# Patient Record
Sex: FEMALE | Race: WHITE | NOT HISPANIC OR LATINO | Employment: STUDENT | URBAN - METROPOLITAN AREA
[De-identification: names, ages, dates, MRNs, and addresses within clinical notes are randomized per-mention and may not be internally consistent; named-entity substitution may affect disease eponyms.]

---

## 2023-05-30 DIAGNOSIS — E65 ABDOMINAL OBESITY: Primary | ICD-10-CM

## 2023-05-30 NOTE — PROGRESS NOTES
Subjective   Reason for Visit: Ryleigh R Luke is an 22 y.o. female here for a Medicare Wellness visit.               HPI    Patient Care Team:  Des Singh DO as PCP - General     Review of Systems    Objective   Vitals:  There were no vitals taken for this visit.      Physical Exam    Assessment/Plan   Problem List Items Addressed This Visit    None

## 2023-06-01 LAB — CORTISOL (UG/DL) IN SERUM - AM: 9.6 UG/DL (ref 5–20)

## 2023-06-05 ENCOUNTER — TELEPHONE (OUTPATIENT)
Dept: PRIMARY CARE | Facility: CLINIC | Age: 23
End: 2023-06-05
Payer: COMMERCIAL

## 2023-06-05 LAB — ADRENOCORTICOTROPIC HORMONE: 17.4 PG/ML (ref 7.2–63.3)

## 2023-08-07 ENCOUNTER — OFFICE VISIT (OUTPATIENT)
Dept: PRIMARY CARE | Facility: CLINIC | Age: 23
End: 2023-08-07
Payer: COMMERCIAL

## 2023-08-07 VITALS
WEIGHT: 256.9 LBS | TEMPERATURE: 96.4 F | BODY MASS INDEX: 42.8 KG/M2 | SYSTOLIC BLOOD PRESSURE: 124 MMHG | OXYGEN SATURATION: 98 % | HEART RATE: 85 BPM | DIASTOLIC BLOOD PRESSURE: 76 MMHG | HEIGHT: 65 IN

## 2023-08-07 DIAGNOSIS — E28.2 PCOS (POLYCYSTIC OVARIAN SYNDROME): ICD-10-CM

## 2023-08-07 DIAGNOSIS — E66.01 CLASS 3 SEVERE OBESITY DUE TO EXCESS CALORIES WITHOUT SERIOUS COMORBIDITY WITH BODY MASS INDEX (BMI) OF 40.0 TO 44.9 IN ADULT (MULTI): ICD-10-CM

## 2023-08-07 DIAGNOSIS — Z00.00 ROUTINE GENERAL MEDICAL EXAMINATION AT A HEALTH CARE FACILITY: ICD-10-CM

## 2023-08-07 DIAGNOSIS — F41.9 ANXIETY: Primary | ICD-10-CM

## 2023-08-07 PROBLEM — E66.813 CLASS 3 SEVERE OBESITY DUE TO EXCESS CALORIES WITHOUT SERIOUS COMORBIDITY WITH BODY MASS INDEX (BMI) OF 40.0 TO 44.9 IN ADULT: Status: ACTIVE | Noted: 2023-08-07

## 2023-08-07 PROCEDURE — 99395 PREV VISIT EST AGE 18-39: CPT | Performed by: STUDENT IN AN ORGANIZED HEALTH CARE EDUCATION/TRAINING PROGRAM

## 2023-08-07 PROCEDURE — 3008F BODY MASS INDEX DOCD: CPT | Performed by: STUDENT IN AN ORGANIZED HEALTH CARE EDUCATION/TRAINING PROGRAM

## 2023-08-07 PROCEDURE — 1036F TOBACCO NON-USER: CPT | Performed by: STUDENT IN AN ORGANIZED HEALTH CARE EDUCATION/TRAINING PROGRAM

## 2023-08-07 RX ORDER — ARIPIPRAZOLE 5 MG/1
5 TABLET ORAL DAILY
COMMUNITY
End: 2023-08-07 | Stop reason: SDUPTHER

## 2023-08-07 RX ORDER — PHENTERMINE HYDROCHLORIDE 15 MG/1
15 CAPSULE ORAL
Qty: 30 CAPSULE | Refills: 0 | Status: SHIPPED | OUTPATIENT
Start: 2023-08-07 | End: 2023-09-21

## 2023-08-07 RX ORDER — MULTIVITAMIN
1 TABLET ORAL DAILY
COMMUNITY
Start: 2022-08-03

## 2023-08-07 RX ORDER — ARIPIPRAZOLE 5 MG/1
5 TABLET ORAL DAILY
Qty: 90 TABLET | Refills: 3 | Status: SHIPPED | OUTPATIENT
Start: 2023-08-07

## 2023-08-07 RX ORDER — ESCITALOPRAM OXALATE 10 MG/1
10 TABLET ORAL DAILY
Qty: 90 TABLET | Refills: 3 | Status: SHIPPED | OUTPATIENT
Start: 2023-08-07

## 2023-08-07 RX ORDER — ESCITALOPRAM OXALATE 10 MG/1
10 TABLET ORAL DAILY
COMMUNITY
End: 2023-08-07 | Stop reason: SDUPTHER

## 2023-08-07 RX ORDER — NORETHINDRONE ACETATE AND ETHINYL ESTRADIOL, ETHINYL ESTRADIOL AND FERROUS FUMARATE 1MG-10(24)
1 KIT ORAL DAILY
COMMUNITY
Start: 2023-07-31

## 2023-08-07 NOTE — PROGRESS NOTES
" Subjective   Patient ID: Ryleigh R Luke is a 23 y.o. female who presents for Annual Exam (Is fasting) and Weight Problem.    HPI     Autism: on lexapro and abilify which helps. Doing well. However she eats a lot of food despite being healthy food, had long discussion about meds, diet and exercise but is hard with her degree of autism. Likely has some PCOS as her menstrual cycle is irratic and has facial hair.     Review of Systems   All other systems reviewed and are negative.      Objective   /76 (BP Location: Right arm, Patient Position: Sitting)   Pulse 85   Temp 35.8 °C (96.4 °F) (Temporal)   Ht 1.662 m (5' 5.43\")   Wt 117 kg (256 lb 14.4 oz)   LMP 03/13/2023   SpO2 98%   BMI 42.19 kg/m²     Physical Exam  Constitutional:       Appearance: Normal appearance.   HENT:      Head: Normocephalic and atraumatic.      Right Ear: Tympanic membrane and ear canal normal.      Left Ear: Tympanic membrane and ear canal normal.      Mouth/Throat:      Mouth: Mucous membranes are moist.      Pharynx: Oropharynx is clear.   Eyes:      Extraocular Movements: Extraocular movements intact.      Conjunctiva/sclera: Conjunctivae normal.      Pupils: Pupils are equal, round, and reactive to light.   Cardiovascular:      Rate and Rhythm: Normal rate and regular rhythm.      Pulses: Normal pulses.      Heart sounds: Normal heart sounds.   Pulmonary:      Effort: Pulmonary effort is normal.      Breath sounds: Normal breath sounds.   Abdominal:      General: Abdomen is flat. Bowel sounds are normal.      Palpations: Abdomen is soft.   Musculoskeletal:         General: Normal range of motion.      Cervical back: Normal range of motion and neck supple.   Skin:     General: Skin is warm and dry.      Capillary Refill: Capillary refill takes 2 to 3 seconds.   Neurological:      General: No focal deficit present.      Mental Status: She is alert and oriented to person, place, and time. Mental status is at baseline. "   Psychiatric:         Mood and Affect: Mood normal.         Behavior: Behavior normal.         Thought Content: Thought content normal.         Judgment: Judgment normal.         Assessment/Plan    1. Anxiety    - ARIPiprazole (Abilify) 5 mg tablet; Take 1 tablet (5 mg) by mouth once daily.  Dispense: 90 tablet; Refill: 3  - escitalopram (Lexapro) 10 mg tablet; Take 1 tablet (10 mg) by mouth once daily.  Dispense: 90 tablet; Refill: 3    2. Class 3 severe obesity due to excess calories without serious comorbidity with body mass index (BMI) of 40.0 to 44.9 in adult (CMS/HCC)    - phentermine 15 mg capsule; Take 1 capsule (15 mg) by mouth once daily in the morning. Take before meals.  Dispense: 30 capsule; Refill: 0    3. PCOS (polycystic ovarian syndrome)    - US pelvis; Future    4. Prevention visit  - no labs, reveiwed recent  - weight loss  Follow up in 1 month

## 2023-09-18 ENCOUNTER — APPOINTMENT (OUTPATIENT)
Dept: PRIMARY CARE | Facility: CLINIC | Age: 23
End: 2023-09-18
Payer: COMMERCIAL

## 2023-09-21 ENCOUNTER — OFFICE VISIT (OUTPATIENT)
Dept: PRIMARY CARE | Facility: CLINIC | Age: 23
End: 2023-09-21
Payer: COMMERCIAL

## 2023-09-21 VITALS
BODY MASS INDEX: 42.22 KG/M2 | WEIGHT: 253.4 LBS | OXYGEN SATURATION: 96 % | SYSTOLIC BLOOD PRESSURE: 126 MMHG | DIASTOLIC BLOOD PRESSURE: 86 MMHG | HEART RATE: 110 BPM | HEIGHT: 65 IN

## 2023-09-21 DIAGNOSIS — E66.01 CLASS 3 SEVERE OBESITY DUE TO EXCESS CALORIES WITHOUT SERIOUS COMORBIDITY WITH BODY MASS INDEX (BMI) OF 40.0 TO 44.9 IN ADULT (MULTI): Primary | ICD-10-CM

## 2023-09-21 PROCEDURE — 3008F BODY MASS INDEX DOCD: CPT | Performed by: STUDENT IN AN ORGANIZED HEALTH CARE EDUCATION/TRAINING PROGRAM

## 2023-09-21 PROCEDURE — 1036F TOBACCO NON-USER: CPT | Performed by: STUDENT IN AN ORGANIZED HEALTH CARE EDUCATION/TRAINING PROGRAM

## 2023-09-21 PROCEDURE — 99213 OFFICE O/P EST LOW 20 MIN: CPT | Performed by: STUDENT IN AN ORGANIZED HEALTH CARE EDUCATION/TRAINING PROGRAM

## 2023-09-21 RX ORDER — PHENTERMINE HYDROCHLORIDE 30 MG/1
30 CAPSULE ORAL
Qty: 30 CAPSULE | Refills: 0 | Status: SHIPPED | OUTPATIENT
Start: 2023-09-21 | End: 2023-10-21

## 2023-09-21 RX ORDER — CEPHALEXIN 500 MG/1
500 CAPSULE ORAL 2 TIMES DAILY
COMMUNITY

## 2023-09-21 NOTE — PROGRESS NOTES
"Subjective   Patient ID: Ryleigh R Luke is a 23 y.o. female who presents for Weight Check.    HPI     Autism: on lexapro and abilify which helps. Doing well. However she eats a lot of food despite being healthy food, had long discussion about meds, diet and exercise but is hard with her degree of autism. Likely has some PCOS as her menstrual cycle is irratic and has facial hair.  PCOs negative, hormones negative, on phentermine and doing well lost a few lbs, eating less will increase dose    Review of Systems   All other systems reviewed and are negative.      Objective   /86 (BP Location: Right arm, Patient Position: Sitting)   Pulse 110   Ht 1.662 m (5' 5.43\")   Wt 115 kg (253 lb 6.4 oz)   SpO2 96%   BMI 41.62 kg/m²     Physical Exam  Constitutional:       Appearance: Normal appearance.   HENT:      Head: Normocephalic and atraumatic.      Right Ear: Tympanic membrane and ear canal normal.      Left Ear: Tympanic membrane and ear canal normal.      Mouth/Throat:      Mouth: Mucous membranes are moist.      Pharynx: Oropharynx is clear.   Eyes:      Extraocular Movements: Extraocular movements intact.      Conjunctiva/sclera: Conjunctivae normal.      Pupils: Pupils are equal, round, and reactive to light.   Cardiovascular:      Rate and Rhythm: Normal rate and regular rhythm.      Pulses: Normal pulses.      Heart sounds: Normal heart sounds.   Pulmonary:      Effort: Pulmonary effort is normal.      Breath sounds: Normal breath sounds.   Abdominal:      General: Abdomen is flat. Bowel sounds are normal.      Palpations: Abdomen is soft.   Musculoskeletal:         General: Normal range of motion.      Cervical back: Normal range of motion and neck supple.   Skin:     General: Skin is warm and dry.      Capillary Refill: Capillary refill takes 2 to 3 seconds.   Neurological:      General: No focal deficit present.      Mental Status: She is alert and oriented to person, place, and time. Mental status is " at baseline.   Psychiatric:         Mood and Affect: Mood normal.         Behavior: Behavior normal.         Thought Content: Thought content normal.         Judgment: Judgment normal.         Assessment/Plan   1. Class 3 severe obesity due to excess calories without serious comorbidity with body mass index (BMI) of 40.0 to 44.9 in adult (CMS/[facility])  - phentermine 30 mg capsule; Take 1 capsule (30 mg) by mouth once daily in the morning. Take before meals.  Dispense: 30 capsule; Refill: 0  - will up the dose  - follow up 1 month

## 2023-10-02 ENCOUNTER — CLINICAL SUPPORT (OUTPATIENT)
Dept: URGENT CARE | Facility: CLINIC | Age: 23
End: 2023-10-02
Payer: COMMERCIAL

## 2023-10-02 VITALS
OXYGEN SATURATION: 96 % | DIASTOLIC BLOOD PRESSURE: 79 MMHG | BODY MASS INDEX: 41.06 KG/M2 | SYSTOLIC BLOOD PRESSURE: 120 MMHG | WEIGHT: 250 LBS | TEMPERATURE: 98.2 F | RESPIRATION RATE: 16 BRPM | HEART RATE: 100 BPM

## 2023-10-02 DIAGNOSIS — H66.93 BILATERAL ACUTE OTITIS MEDIA: ICD-10-CM

## 2023-10-02 DIAGNOSIS — H61.23 IMPACTED CERUMEN, BILATERAL: ICD-10-CM

## 2023-10-02 DIAGNOSIS — H61.23 BILATERAL IMPACTED CERUMEN: ICD-10-CM

## 2023-10-02 DIAGNOSIS — H92.02 OTALGIA, LEFT EAR: Primary | ICD-10-CM

## 2023-10-02 PROCEDURE — 69209 REMOVE IMPACTED EAR WAX UNI: CPT | Performed by: FAMILY MEDICINE

## 2023-10-02 PROCEDURE — 99214 OFFICE O/P EST MOD 30 MIN: CPT | Performed by: FAMILY MEDICINE

## 2023-10-02 RX ORDER — AMOXICILLIN 875 MG/1
875 TABLET, FILM COATED ORAL 2 TIMES DAILY
Qty: 14 TABLET | Refills: 0 | Status: SHIPPED | OUTPATIENT
Start: 2023-10-02 | End: 2023-10-09

## 2023-10-02 NOTE — PATIENT INSTRUCTIONS
You have bilateral acute otitis media or middle ear infection.  You had bilateral cerumen impaction which was removed by irrigation  Please increase your oral fluids for the next 7-10 days  Please take Amoxicillin Prescription as prescribed  I recommend the use of probiotics while taking antibiotic and for an additional 7-10 days after you've completed treatment. Please ask your pharmacist for advice on appropriate product for this purpose  May take ibuprofen 200mg, 2 tablets by mouth every 8 hours with food for discomfort.  May use Tylenol 325 mg, one or 2 tablets by mouth every 4-6 hours as needed for additional pain relief.   If no better in 5-7 days please follow-up with primary care provider.   If fever greater than 102 degrees Fahrenheit, chills, nausea, vomiting, bleeding from ears, drainage from ears, increased difficulty breathing, difficulty swallowing, increased wheezing, shortness of breath please go immediately to emergency room for further evaluation.    This note was generated by voice recognition software. Minor transcription/grammatical errors may be present. Please call for clarification..

## 2023-10-02 NOTE — PROGRESS NOTES
Subjective   Patient ID: Ryleigh R Luke is a 23 y.o. female.     23-year-old  female presents with mother with complaint of left ear pain.  She reports that she was seen here recently for pain in her right ear.  Given ofloxacin drops and this has resolved.  She also has concerned that she was told she had a perforation of the left eardrum.      Earache         The following portions of the chart were reviewed this encounter and updated as appropriate:         Review of Systems   HENT:  Positive for ear pain.     patient denies nausea, vomiting, fever, chills, headache.  Reports ear pain.  Denies  rhinorrhea, nasal congestion, sore throat.  Complains of slight cough.  Denies wheezes, shortness of breath, chest pain, palpitations. Patient denies abdominal cramping, diarrhea or constipation. Denies dysuria or increased urinary frequency.  Objective   Physical Exam    Vital signs are reviewed. Alert and oriented x3 with normal mood and affect  Patient is well nourished, well-developed, alert and in no acute distress  Denies pain to palpation over frontal, ethmoid or maxillary sinus areas    External eyes, orbits, conjunctiva and eyelids are normal in appearance  Pupils are equal, round, reactive to light and accommodation, extraocular movements intact    External ears appear normal  External canals are normal in appearance  Right tympanic membrane is obscured by cerumen, following irrigation canal is mildly excoriated and reddened.  TM is dull yellow in appearance  Left tympanic membrane is obscured by cerumen, following irrigation canal is mildly reddened.  Tympanic membrane intact and  dull gray in appearance  There is no middle ear effusion noted on the right  There is no middle ear effusion noted on the left  External appearance of the nose is normal  Nasal mucosa, septum, turbinates are  mildly swollen and slightly reddened in appearance  There is thin yellow nasal discharge in both nares    Oral mucosa is  uniformly pink and moist  Palate is pink, symmetric and intact  Tongue is moist, mobile and midline  Tonsils are present, not enlarged, not erythematous with no concretions or exudates present  No cervical lymphadenopathy palpated    Heart has regular rate and rhythm. No murmurs, rubs or gallops are auscultated at this exam.    Respiratory rate rhythm and effort are normal. Breath sounds bilaterally are clear on auscultation without crackles, rhonchi, wheezes or friction rub.    Abdomen: Normal bowel sounds on auscultation. Soft, nontender without rebound or rigidity on palpation          Assessment/Plan

## 2023-11-07 ENCOUNTER — LAB REQUISITION (OUTPATIENT)
Dept: LAB | Facility: HOSPITAL | Age: 23
End: 2023-11-07
Payer: COMMERCIAL

## 2023-11-07 DIAGNOSIS — J06.9 ACUTE UPPER RESPIRATORY INFECTION, UNSPECIFIED: ICD-10-CM

## 2023-11-08 ENCOUNTER — LAB REQUISITION (OUTPATIENT)
Dept: LAB | Facility: HOSPITAL | Age: 23
End: 2023-11-08
Payer: COMMERCIAL

## 2023-11-08 DIAGNOSIS — J06.9 ACUTE UPPER RESPIRATORY INFECTION, UNSPECIFIED: ICD-10-CM

## 2023-11-08 LAB — S PYO DNA THROAT QL NAA+PROBE: NOT DETECTED

## 2023-11-08 PROCEDURE — 87651 STREP A DNA AMP PROBE: CPT

## 2023-11-16 ENCOUNTER — OFFICE VISIT (OUTPATIENT)
Dept: OTOLARYNGOLOGY | Facility: CLINIC | Age: 23
End: 2023-11-16
Payer: COMMERCIAL

## 2023-11-16 ENCOUNTER — CLINICAL SUPPORT (OUTPATIENT)
Dept: AUDIOLOGY | Facility: CLINIC | Age: 23
End: 2023-11-16
Payer: COMMERCIAL

## 2023-11-16 VITALS — WEIGHT: 250 LBS | HEIGHT: 66 IN | BODY MASS INDEX: 40.18 KG/M2 | TEMPERATURE: 97.9 F

## 2023-11-16 DIAGNOSIS — Q18.1 CYST ON EAR: ICD-10-CM

## 2023-11-16 DIAGNOSIS — H61.23 BILATERAL IMPACTED CERUMEN: Primary | ICD-10-CM

## 2023-11-16 DIAGNOSIS — H92.02 OTALGIA, LEFT EAR: Primary | ICD-10-CM

## 2023-11-16 PROCEDURE — 92550 TYMPANOMETRY & REFLEX THRESH: CPT | Performed by: AUDIOLOGIST

## 2023-11-16 PROCEDURE — 3008F BODY MASS INDEX DOCD: CPT | Performed by: NURSE PRACTITIONER

## 2023-11-16 PROCEDURE — 69210 REMOVE IMPACTED EAR WAX UNI: CPT | Performed by: NURSE PRACTITIONER

## 2023-11-16 PROCEDURE — 1036F TOBACCO NON-USER: CPT | Performed by: NURSE PRACTITIONER

## 2023-11-16 PROCEDURE — 99203 OFFICE O/P NEW LOW 30 MIN: CPT | Performed by: NURSE PRACTITIONER

## 2023-11-16 PROCEDURE — 92557 COMPREHENSIVE HEARING TEST: CPT | Performed by: AUDIOLOGIST

## 2023-11-16 ASSESSMENT — PATIENT HEALTH QUESTIONNAIRE - PHQ9
SUM OF ALL RESPONSES TO PHQ9 QUESTIONS 1 AND 2: 0
1. LITTLE INTEREST OR PLEASURE IN DOING THINGS: NOT AT ALL
2. FEELING DOWN, DEPRESSED OR HOPELESS: NOT AT ALL

## 2023-11-16 NOTE — PROGRESS NOTES
Chief Complaint   Patient presents with    Scar       HISTORY:  Ryleigh R Luke, age 23 years, was seen for audiogram in conjunction with otolaryngology appointment on 11/16/2023.  Ryleigh reports left ear scar due to ear pod use.  She denies hearing loss, ear pain, ear pressure, tinnitus, dizziness and ear surgery.  She had noted left ear discomfort in the past.  Please see medical records for complete history.    RESULTS:  Prior to testing both external auditory canals had mild cerumen visualized    Immittance and acoustic reflexes:  Immittance testing yielded TYPE A tympanograms indicating normal middle ear function right ear  Immittance testing yielded TYPE A tympanograms indicating normal middle ear function left ear  Acoustic reflexes were present 500 - 4000 Hz right ear  Acoustic reflexes were present 500 - 4000 Hz left ear    Audiogram:  Normal hearing levels were obtained 250 - 8000 Hz both ears  Speech reception thresholds obtained at 5 dBHL both ears  Speech discrimination scores were 100% at 40 dBHL      IMPRESSIONS:  Normal middle ear function noted both ears  Normal acoustic reflexes noted both ears  Normal hearing levels     RECOMMENDATIONS:  1.  Follow up with otolaryngology  2.  Retest hearing levels annually      time: 1300 -1320

## 2023-11-16 NOTE — PROGRESS NOTES
"Subjective   Patient ID: Ryleigh R Luke is a 23 y.o. female who presents for Cerumen Impaction and Otitis Media.  HPI  The patient is referred for evaluation of recent ear infections.  When asked about ear pain, hearing loss, discharge from ear, tinnitus, aural fullness or autophony in the affected ear or ears, the patient admits to left then right otalgia following URI symptoms in August.  This resulted in a left tympanic membrane perforation.  At that time she had severe pain and drainage.  She is also noted to have a lesion to her left outer ear.  This resolved well with oral Keflex.  Today, she denies any otalgia, otorrhea, hearing loss, tinnitus, EAC itching.  When asked about a significant past otological history including history of prior ear surgery, noise exposure, exposure to ototoxic drugs or agents, and/or family history of hearing loss, the patient admits to noise exposure from wearing headphones \"almost all the time.\"  Patient's mother reports that they did take a break from wearing the headphones during her infections.    Review of Systems  A comprehensive or 10 points review of the patient´s constitutional, neurological, HEENT, pulmonary, cardiovascular and genito-urinary systems showed only those mentioned in history of present illness.    Objective   Physical Exam  Constitutional: no fever, chills, weight loss or weight gain   General appearance: Appears well, well-nourished, well groomed. No acute distress.   Communication: Normal communication   Psychiatric: Oriented to person, place and time. Normal mood and affect.   Neurologic: Cranial nerves II-XII grossly intact and symmetric bilaterally.   Head and Face:   Head: Atraumatic with no masses, lesions or scarring.   Face: Normal symmetry, no paralysis, synkinesis or facial tic. No scars or deformities.   TMJ: Normal, no trismus.   Eyes: Conjunctiva not edematous or erythematous   Ears: External inspection of ears with no deformity, scars or " masses with the exception of 3 mm cystic mass to the left posterior chondral fold.  This is nontender. Bilateral EACs with cerumen impactions  Neck: Normal appearing, symmetric, trachea midline.   Cardiovascular: Examination of peripheral vascular system shows no clubbing or cyanosis.   Respiratory: No respiratory distress increased work of breathing. Inspection of the chest with symmetric chest expansion and normal respiratory effort.   Skin: No rashes in the head or neck  My interpretation of the audiogram done today is normal hearing with excellent word recognition scores and normal tympanograms bilaterally.  Assessment/Plan     This patient presents for initial evaluation of acute acquired bilateral cerumen impaction and left outer ear cyst.   Reassurance given that otologic exam is normal after cleaning.  Both ear canals are somewhat narrow and angle superiorly.  Both TMs are intact.  No effusions or retractions noted.  Cystic lesion on left auricle is healing well.  Mom reports that it is significantly smaller after taking oral antibiotics.  Patient reports that it is no longer tender.  I recommended observation.  This may have been irritated by wearing headphones so often.  I suggested using moleskin stickers over the area if she uses the headphones going forward.  If this does not completely resolve, I would recommend that she follow-up with facial plastics for removal.  She may follow-up with me as needed.  All questions were answered to patient and family's satisfaction.    This note was created using speech recognition transcription software. Despite proofreading, several typographical errors might be present that might affect the meaning of the content. Please call with any questions.  Patient ID: Ryleigh R Luke is a 23 y.o. female.    Ear cerumen removal    Date/Time: 11/16/2023 1:40 PM    Performed by: GERALDO Sena  Authorized by: GERALDO Sena    Consent:     Consent obtained:   Verbal    Consent given by:  Patient    Risks discussed:  Pain    Alternatives discussed:  No treatment  Procedure details:     Location:  L ear and R ear    Procedure type: curette      Procedure outcomes: cerumen removed    Post-procedure details:     Inspection:  No bleeding, ear canal clear and TM intact    Hearing quality:  Improved    Procedure completion:  Tolerated well, no immediate complications

## 2024-05-13 ENCOUNTER — CLINICAL SUPPORT (OUTPATIENT)
Dept: AUDIOLOGY | Facility: CLINIC | Age: 24
End: 2024-05-13
Payer: COMMERCIAL

## 2024-05-13 ENCOUNTER — OFFICE VISIT (OUTPATIENT)
Dept: OTOLARYNGOLOGY | Facility: CLINIC | Age: 24
End: 2024-05-13
Payer: COMMERCIAL

## 2024-05-13 VITALS — TEMPERATURE: 97.9 F | BODY MASS INDEX: 41.62 KG/M2 | WEIGHT: 259 LBS | HEIGHT: 66 IN

## 2024-05-13 DIAGNOSIS — H91.93 HEARING DIFFICULTY OF BOTH EARS: ICD-10-CM

## 2024-05-13 DIAGNOSIS — H93.13 TINNITUS OF BOTH EARS: Primary | ICD-10-CM

## 2024-05-13 DIAGNOSIS — H61.23 BILATERAL IMPACTED CERUMEN: ICD-10-CM

## 2024-05-13 DIAGNOSIS — H93.8X3 SENSATION OF FULLNESS IN BOTH EARS: Primary | ICD-10-CM

## 2024-05-13 PROCEDURE — 1036F TOBACCO NON-USER: CPT | Performed by: NURSE PRACTITIONER

## 2024-05-13 PROCEDURE — 99213 OFFICE O/P EST LOW 20 MIN: CPT | Performed by: NURSE PRACTITIONER

## 2024-05-13 PROCEDURE — 3008F BODY MASS INDEX DOCD: CPT | Performed by: NURSE PRACTITIONER

## 2024-05-13 PROCEDURE — 92557 COMPREHENSIVE HEARING TEST: CPT | Performed by: AUDIOLOGIST

## 2024-05-13 PROCEDURE — 92550 TYMPANOMETRY & REFLEX THRESH: CPT | Performed by: AUDIOLOGIST

## 2024-05-13 ASSESSMENT — PATIENT HEALTH QUESTIONNAIRE - PHQ9
1. LITTLE INTEREST OR PLEASURE IN DOING THINGS: NOT AT ALL
SUM OF ALL RESPONSES TO PHQ9 QUESTIONS 1 AND 2: 0
2. FEELING DOWN, DEPRESSED OR HOPELESS: NOT AT ALL

## 2024-05-13 NOTE — PROGRESS NOTES
"  ADULT AUDIOMETRIC EVALUATION    Name:  Ryleigh R Luke  :  2000  Age:  23 y.o.  Date of Evaluation:  May 13, 2024    HISTORY:  Reason for visit: Ms. Castillo is seen today for an evaluation of hearing. Patient was accompanied by her grandfather.     Patient reports concern for hearing differently between the ears, right ear weaker than the left ear. She believes she has a narrow ear canal on the right side which may be contributing. She does endorse infrequent tinnitus.    Denies: history of otologic surgery, otalgia, and otorrhea    EVALUATION:    See Audiogram and Immittance results under \"Media\".    RESULTS:     Otoscopic Evaluation:     RIGHT  Clear canal with tympanic membrane visualized    LEFT  Clear canal with tympanic membrane visualized    Immittance:   Immittance Measures: 226 Hz          Right Ear: Type A: Normal middle ear function         Left Ear:  Type A: Normal middle ear function    Reflexes and Reflex Decay:    Ipsilateral Reflexes (500-4000 Hz):          Probe/Stimulus Right Ear: present       Probe/Stimulus Left Ear: present    Otoacoustic Emissions [DP(OAEs)]:  Right Ear: DPOAEs present and robust 6738-6247 Hz consistent with normal to near normal outer hair cell function and hearing at those frequencies.   Left Ear: DPOAEs present and robust 8301-7906 Hz consistent with normal to near normal outer hair cell function and hearing at those frequencies.          Audiometry:  Test Technique: Standard Audiometry under TDH headphones.    Reliability: Good     Pure Tone Audiometry:    Right: Hearing levels within normal limits 125-8000 Hz   Left: Hearing levels within normal limits 125-8000 Hz    Speech Audiometry (via recorded, 25-words unless noted; M=masked):       Right Ear: Speech Reception Threshold (SRT) was obtained at 0 dBHL  Word Recognition Scores were Excellent (96%) in quiet when words were presented at 45 dBHL, using the NU-6 2A word list.  Left Ear: Speech Reception Threshold (SRT) " was obtained at 0 dBHL  Word Recognition Scores were Excellent (96%) in quiet when words were presented at 45 dBHL, using the NU-6 3A word list.    IMPRESSIONS:  Today's test results suggest normal middle ear function.    Hearing levels within normal limits 125-8000 Hz with excellent word understanding, both ears.    Compared to previous testing dated 11/16/23, thresholds are stable.    PATIENT EDUCATION:   Ryleigh Luke and her grandfather were counseled with regard to the findings.       PLAN:  Follow up with Linda Rubin CNP as directed.  Retest hearing if concerns or changes arise.        Dejuan Aleman, CCC-A  Clinical Audiologist    Time: 1708-6116    This note was created using speech recognition transcription software. Despite proofreading, several typographical errors might be present that might affect the meaning of the content. Please call with any questions.     Degree of   Hearing Sensitivity dB Range   Within Normal Limits (WNL) 0 - 20   Slight 25   Mild 26 - 40   Moderate 41 - 55   Moderately-Severe 56 - 70   Severe 71 - 90   Profound 91 +     KEY  TM Tympanic Membrane   WNL Within Normal Limits   HA Hearing Aid   SNHL Sensorineural Hearing Loss   CHL Conductive Hearing Loss   NIHL Noise-Induced Hearing Loss   ECV Ear Canal Volume

## 2024-05-13 NOTE — PROGRESS NOTES
Subjective   Patient ID: Ryleigh R Luke is a 23 y.o. female who presents for Hearing Loss (Right ear ).  HPI  This patient presents for scheduled cerumen removal.  She contacted my office with concerns about decreased hearing bilaterally.  Today, she also endorses bilateral fullness.  She is accompanied by a family member.  She does not wear hearing amplification.  Review of Systems  A comprehensive or 10 points review of the patient´s constitutional, neurological, HEENT, pulmonary, cardiovascular and genito-urinary systems showed only those mentioned in history of present illness.    Objective   Physical Exam  Constitutional: no fever, chills, weight loss or weight gain   General appearance: Appears well, well-nourished, well groomed. No acute distress.   Communication: Normal communication   Psychiatric: Oriented to person, place and time. Normal mood and affect.   Neurologic: Cranial nerves II-XII grossly intact and symmetric bilaterally.   Head and Face:   Head: Atraumatic with no masses, lesions or scarring.   Face: Normal symmetry, no paralysis, synkinesis or facial tic. No scars or deformities.     Eyes: Conjunctiva not edematous or erythematous   Ears: External inspection of ears with no deformity, scars or masses.  Bilateral canals with cerumen impactions     Neck: Normal appearing, symmetric, trachea midline.   Cardiovascular: Examination of peripheral vascular system shows no clubbing or cyanosis.   Respiratory: No respiratory distress increased work of breathing. Inspection of the chest with symmetric chest expansion and normal respiratory effort.   Skin: No rashes in the head or neck  My interpretation of the audiogram done today after cleaning is normal hearing with excellent word recognition scores and normal tympanograms bilaterally.  OAE's were also normal in both ears at all frequencies.  Assessment/Plan        This patient presents for subsequent evaluation of acute acquired bilateral cerumen  impaction, bilateral aural fullness, bilateral hearing difficulty.    Reassurance given that otologic exam is normal after cleaning and her audiogram today is normal.  She may follow-up with me as needed.  All questions were answered to patient and family's satisfaction.    This note was created using speech recognition transcription software. Despite proofreading, several typographical errors might be present that might affect the meaning of the content. Please call with any questions.    Patient ID: Ryleigh R Luke is a 23 y.o. female.    Ear cerumen removal    Date/Time: 5/13/2024 2:49 PM    Performed by: GERALDO Sena  Authorized by: GERALDO Sena    Consent:     Consent obtained:  Verbal    Consent given by:  Patient    Risks discussed:  Pain    Alternatives discussed:  No treatment  Procedure details:     Location:  L ear and R ear    Procedure type comment:  Suction    Procedure outcomes: cerumen removed    Post-procedure details:     Inspection:  No bleeding, ear canal clear and TM intact    Hearing quality:  Normal    Procedure completion:  Tolerated well, no immediate complications    GERALDO Sena 05/13/24 2:24 PM

## 2024-12-23 ENCOUNTER — PATIENT MESSAGE (OUTPATIENT)
Dept: PRIMARY CARE | Facility: CLINIC | Age: 24
End: 2024-12-23
Payer: COMMERCIAL

## 2025-01-02 ENCOUNTER — APPOINTMENT (OUTPATIENT)
Dept: PRIMARY CARE | Facility: CLINIC | Age: 25
End: 2025-01-02
Payer: COMMERCIAL

## 2025-01-02 VITALS
WEIGHT: 241 LBS | DIASTOLIC BLOOD PRESSURE: 88 MMHG | SYSTOLIC BLOOD PRESSURE: 124 MMHG | HEART RATE: 110 BPM | OXYGEN SATURATION: 96 % | HEIGHT: 66 IN | BODY MASS INDEX: 38.73 KG/M2

## 2025-01-02 DIAGNOSIS — Z00.00 ROUTINE GENERAL MEDICAL EXAMINATION AT A HEALTH CARE FACILITY: Primary | ICD-10-CM

## 2025-01-02 PROCEDURE — 1036F TOBACCO NON-USER: CPT | Performed by: STUDENT IN AN ORGANIZED HEALTH CARE EDUCATION/TRAINING PROGRAM

## 2025-01-02 PROCEDURE — 3008F BODY MASS INDEX DOCD: CPT | Performed by: STUDENT IN AN ORGANIZED HEALTH CARE EDUCATION/TRAINING PROGRAM

## 2025-01-02 PROCEDURE — 99395 PREV VISIT EST AGE 18-39: CPT | Performed by: STUDENT IN AN ORGANIZED HEALTH CARE EDUCATION/TRAINING PROGRAM

## 2025-01-02 ASSESSMENT — ENCOUNTER SYMPTOMS: DEPRESSION: 1

## 2025-01-02 ASSESSMENT — PATIENT HEALTH QUESTIONNAIRE - PHQ9
2. FEELING DOWN, DEPRESSED OR HOPELESS: SEVERAL DAYS
10. IF YOU CHECKED OFF ANY PROBLEMS, HOW DIFFICULT HAVE THESE PROBLEMS MADE IT FOR YOU TO DO YOUR WORK, TAKE CARE OF THINGS AT HOME, OR GET ALONG WITH OTHER PEOPLE: SOMEWHAT DIFFICULT
1. LITTLE INTEREST OR PLEASURE IN DOING THINGS: NOT AT ALL
SUM OF ALL RESPONSES TO PHQ9 QUESTIONS 1 AND 2: 0
SUM OF ALL RESPONSES TO PHQ9 QUESTIONS 1 AND 2: 1
2. FEELING DOWN, DEPRESSED OR HOPELESS: NOT AT ALL
1. LITTLE INTEREST OR PLEASURE IN DOING THINGS: NOT AT ALL

## 2025-01-02 ASSESSMENT — PROMIS GLOBAL HEALTH SCALE
RATE_QUALITY_OF_LIFE: VERY GOOD
CARRYOUT_SOCIAL_ACTIVITIES: GOOD
RATE_SOCIAL_SATISFACTION: FAIR
RATE_GENERAL_HEALTH: GOOD
RATE_PHYSICAL_HEALTH: GOOD
CARRYOUT_PHYSICAL_ACTIVITIES: MOSTLY
RATE_AVERAGE_FATIGUE: MODERATE
EMOTIONAL_PROBLEMS: OFTEN
RATE_MENTAL_HEALTH: FAIR
RATE_AVERAGE_PAIN: 0

## 2025-01-02 NOTE — PROGRESS NOTES
"vSubjective   Patient ID: Ryleigh R Luke is a 24 y.o. female who presents for Annual Exam (Not fasting).    HPI     Autism: off lexapro and abilify which helps. Doing well. However she eats a lot of food despite being healthy food, had long discussion about meds, diet and exercise but is hard with her degree of autism. Likely has some PCOS as her menstrual cycle is irratic and has facial hair.  PCOs negative, hormones negative, off phentermine as well    Review of Systems   All other systems reviewed and are negative.      Objective   /88 (BP Location: Left arm, Patient Position: Sitting, BP Cuff Size: Large adult)   Pulse 110   Ht 1.676 m (5' 6\")   Wt 109 kg (241 lb)   LMP  (LMP Unknown) Comment: has not had one in over a year  SpO2 96%   BMI 38.90 kg/m²     Physical Exam  Constitutional:       Appearance: Normal appearance.   HENT:      Head: Normocephalic and atraumatic.      Right Ear: Tympanic membrane and ear canal normal.      Left Ear: Tympanic membrane and ear canal normal.      Mouth/Throat:      Mouth: Mucous membranes are moist.      Pharynx: Oropharynx is clear.   Eyes:      Extraocular Movements: Extraocular movements intact.      Conjunctiva/sclera: Conjunctivae normal.      Pupils: Pupils are equal, round, and reactive to light.   Cardiovascular:      Rate and Rhythm: Normal rate and regular rhythm.      Pulses: Normal pulses.      Heart sounds: Normal heart sounds.   Pulmonary:      Effort: Pulmonary effort is normal.      Breath sounds: Normal breath sounds.   Abdominal:      General: Abdomen is flat. Bowel sounds are normal.      Palpations: Abdomen is soft.   Musculoskeletal:         General: Normal range of motion.      Cervical back: Normal range of motion and neck supple.   Skin:     General: Skin is warm and dry.      Capillary Refill: Capillary refill takes 2 to 3 seconds.   Neurological:      General: No focal deficit present.      Mental Status: She is alert and oriented to " person, place, and time. Mental status is at baseline.   Psychiatric:         Mood and Affect: Mood normal.         Behavior: Behavior normal.         Thought Content: Thought content normal.         Judgment: Judgment normal.         Assessment/Plan   1. Routine general medical examination at a health care facility (Primary)  Doing well  Has no period since off ocp 2 months ago  Will follow